# Patient Record
Sex: FEMALE | Race: WHITE | NOT HISPANIC OR LATINO | Employment: UNEMPLOYED | URBAN - METROPOLITAN AREA
[De-identification: names, ages, dates, MRNs, and addresses within clinical notes are randomized per-mention and may not be internally consistent; named-entity substitution may affect disease eponyms.]

---

## 2022-06-02 LAB — HCV AB SER-ACNC: NEGATIVE

## 2023-08-21 ENCOUNTER — TELEPHONE (OUTPATIENT)
Dept: ADMINISTRATIVE | Facility: OTHER | Age: 53
End: 2023-08-21

## 2023-08-21 RX ORDER — GABAPENTIN 300 MG/1
CAPSULE ORAL
COMMUNITY
Start: 2023-04-10 | End: 2023-08-22

## 2023-08-21 RX ORDER — METHOCARBAMOL 500 MG/1
500 TABLET, FILM COATED ORAL
COMMUNITY
Start: 2022-08-18 | End: 2023-08-22 | Stop reason: SDUPTHER

## 2023-08-21 NOTE — TELEPHONE ENCOUNTER
----- Message from Laz Jones sent at 8/21/2023  9:19 AM EDT -----  Regarding: care gap request HEP C, Mammogram, PAP  08/21/23 9:19 AM    Hello, our patient attached above has had Hepatitis C completed/performed. Please assist in updating the patient chart by pulling the Care Everywhere (CE) document. The date of service is 6/2/2022. Thank you,  Laz [facility]    08/21/23 9:19 AM    Hel, our patient Yulia Haines has had Mammogram completed/performed. Please assist in updating the patient chart by pulling the Care Everywhere (CE) document. The date of service is 6/2/2022. Thank you,  Laz [facility]     08/21/23 9:19 AM    Hel, our patient Yulia Haines has had Pap Smear (HPV) aka Cervical Cancer Screening completed/performed. Please assist in updating the patient chart by pulling the Care Everywhere (CE) document. The date of service is 7/26/2021.      Thank you,  Laz Jones   [address]

## 2023-08-21 NOTE — TELEPHONE ENCOUNTER
Upon review of the In Basket request we were able to locate, review, and update the patient chart as requested for Hepatitis C , Mammogram and Pap Smear (HPV) aka Cervical Cancer Screening. Any additional questions or concerns should be emailed to the Practice Liaisons via the appropriate education email address, please do not reply via In Basket.     Thank you  Nasra Sutherland

## 2023-08-22 ENCOUNTER — OFFICE VISIT (OUTPATIENT)
Dept: FAMILY MEDICINE CLINIC | Facility: CLINIC | Age: 53
End: 2023-08-22
Payer: COMMERCIAL

## 2023-08-22 VITALS
WEIGHT: 133.8 LBS | HEIGHT: 64 IN | RESPIRATION RATE: 16 BRPM | OXYGEN SATURATION: 98 % | BODY MASS INDEX: 22.84 KG/M2 | SYSTOLIC BLOOD PRESSURE: 122 MMHG | TEMPERATURE: 96.3 F | DIASTOLIC BLOOD PRESSURE: 80 MMHG | HEART RATE: 74 BPM

## 2023-08-22 DIAGNOSIS — F33.0 DEPRESSION, MAJOR, RECURRENT, MILD (HCC): ICD-10-CM

## 2023-08-22 DIAGNOSIS — R76.8 POSITIVE ANA (ANTINUCLEAR ANTIBODY): ICD-10-CM

## 2023-08-22 DIAGNOSIS — M62.838 MUSCLE SPASM: ICD-10-CM

## 2023-08-22 DIAGNOSIS — Z76.89 ENCOUNTER TO ESTABLISH CARE: Primary | ICD-10-CM

## 2023-08-22 DIAGNOSIS — Z12.31 SCREENING MAMMOGRAM FOR BREAST CANCER: ICD-10-CM

## 2023-08-22 DIAGNOSIS — Z12.4 CERVICAL CANCER SCREENING: ICD-10-CM

## 2023-08-22 DIAGNOSIS — F41.9 ANXIETY: ICD-10-CM

## 2023-08-22 DIAGNOSIS — E78.2 MIXED HYPERLIPIDEMIA: ICD-10-CM

## 2023-08-22 DIAGNOSIS — G57.31: ICD-10-CM

## 2023-08-22 DIAGNOSIS — R73.01 ELEVATED FASTING GLUCOSE: ICD-10-CM

## 2023-08-22 PROBLEM — M21.371 RIGHT FOOT DROP: Status: ACTIVE | Noted: 2023-03-08

## 2023-08-22 PROBLEM — M16.11 PRIMARY OSTEOARTHRITIS OF RIGHT HIP: Status: ACTIVE | Noted: 2022-08-18

## 2023-08-22 PROCEDURE — 99204 OFFICE O/P NEW MOD 45 MIN: CPT | Performed by: NURSE PRACTITIONER

## 2023-08-22 RX ORDER — METHOCARBAMOL 500 MG/1
500 TABLET, FILM COATED ORAL 3 TIMES DAILY PRN
Qty: 30 TABLET | Refills: 0 | Status: SHIPPED | OUTPATIENT
Start: 2023-08-22

## 2023-08-22 NOTE — ASSESSMENT & PLAN NOTE
Resulting from hip replacement surgery/drop foot. Utilize MAFO when needed or cane for ambulatory support.  Referral to physiatry given per patient request.
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
## 2023-08-22 NOTE — PATIENT INSTRUCTIONS
Complete labs, these are fasting. Follow up with gynecology and physiatry. Referrals provided. Robaxin refilled. Re-start therapy. Please call the office if you are experiencing any worsening of symptoms or no symptom improvement. How to find a therapist:  You can call the back of your insurance card for covered providers or check on your insurance's website. Or you can visit [employer] to find a covered therapist. Lou [facility] can filter by your insurance type, location, and other preferences. This will give you a list with pictures and bios about the therapists so you can appropriately choose one you feel will be the most helpful for your goals.

## 2023-08-25 ENCOUNTER — TELEPHONE (OUTPATIENT)
Dept: PSYCHIATRY | Facility: CLINIC | Age: 53
End: 2023-08-25

## 2023-08-25 NOTE — TELEPHONE ENCOUNTER
Patient contacted the office seeking to schedule an appt with Leslee Angulo. Writer verified that the patient is established at the [facility] office. Writer informed the patient he will notify the provider's PSR of the call. The PSR will be in contact at her earliest convenience to assist with scheduling.

## 2023-08-25 NOTE — ASSESSMENT & PLAN NOTE
Declines medication. Recommended to re-start therapy.  Information given on innovations- patient declines

## 2023-09-15 ENCOUNTER — SOCIAL WORK (OUTPATIENT)
Dept: BEHAVIORAL/MENTAL HEALTH CLINIC | Facility: CLINIC | Age: 53
End: 2023-09-15

## 2023-09-15 ENCOUNTER — TELEPHONE (OUTPATIENT)
Dept: FAMILY MEDICINE CLINIC | Facility: CLINIC | Age: 53
End: 2023-09-15

## 2023-09-15 DIAGNOSIS — F43.10 POSTTRAUMATIC STRESS DISORDER WITH DISSOCIATIVE SYMPTOMS: Primary | ICD-10-CM

## 2023-09-15 DIAGNOSIS — G57.31: Primary | ICD-10-CM

## 2023-09-15 DIAGNOSIS — F33.0 DEPRESSION, MAJOR, RECURRENT, MILD (HCC): ICD-10-CM

## 2023-09-15 NOTE — TELEPHONE ENCOUNTER
----- Message from Pj Shantanu sent at 9/12/2023  9:24 AM EDT -----  Regarding: FW: Physiatry  Contact: [phone]    ----- Message -----  From: Nikki Kermitaustin  Sent: 9/11/2023   7:35 PM EDT  To: [facility]  Subject: Physiatry                                        Great thank you so much, Kaley Mary. I believe their office said that they would call me for an appointment after they received the records, so if I don't hear from them, I'll let you know. Also, I would like an EMG which they said that they will need a script for. Please let Lizy Pruitt know that my last EMG was 11/22 and it's almost a year. Probably time for another one, in my opinion so in order to facilitate that down the road with Dr. Michoacano Barrow I would appreciate a script. I'm also not a doctor, though!  Thank you again,   Catherine Rich
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
## 2023-10-01 NOTE — PROGRESS NOTES
Subjective      Chata Monroy is a 52 y.o. female who presents for annual exam.  Last Pap smear 21 NILM/ HR HPV(-). Last mammogram 22 birads 1. CRC screening spring 2022 with [facility]. The patient has no complaints today. The patient is sexually active. The patient is not taking hormone replacement therapy. Patient denies post-menopausal vaginal bleeding. . The patient wears seatbelts: yes. The patient participates in regular exercise: no. The patient reports that there is not domestic violence in her life. Menstrual History:  OB History        5    Para   1    Term                AB   4    Living   1       SAB        IAB        Ectopic        Multiple        Live Births   1                Menarche age: 13  No LMP recorded. The following portions of the patient's history were reviewed and updated as appropriate: allergies, current medications, past family history, past medical history, past social history, past surgical history and problem list.    Review of Systems  Pertinent items are noted in HPI. Objective      /78   Ht 5' 4" (1.626 m)   Wt 60.3 kg (133 lb)   BMI 22.83 kg/m²     General:   alert and oriented, in no acute distress   Heart: regular rate and rhythm, S1, S2 normal, no murmur, click, rub or gallop   Lungs: clear to auscultation bilaterally   Abdomen: soft, non-tender, without masses or organomegaly   Vulva: normal, Bartholin's, Urethra, [place]'s normal   Vagina: normal mucosa, normal discharge, no palpable nodules   Cervix: no cervical motion tenderness and no lesions   Uterus: normal size, non-tender, normal shape and consistency   Adnexa: normal adnexa and no mass, fullness, tenderness   Breast:  breasts appear normal, no suspicious masses, no skin or nipple changes or axillary nodes.           Assessment/Plan     Diagnoses and all orders for this visit:    Well woman exam with routine gynecological exam    Cervical cancer screening  -     Ambulatory Referral to Obstetrics / Gynecology    Other orders  -     Diclofenac Sodium (Voltaren) 1 %      Care gap request placed for colonoscopy results  Encouraged to schedule mammogram   Encouraged healthy diet, exercise and lifestyle  Encouraged follow-up with PCP and specialists as needed  Encouraged seasonal influenza vaccination  Encouraged supplementation with calcium, vitamin-D and strength training exercises for good bone health  Will call / Mychart message with results

## 2023-10-02 ENCOUNTER — OFFICE VISIT (OUTPATIENT)
Dept: OBGYN CLINIC | Facility: MEDICAL CENTER | Age: 53
End: 2023-10-02
Payer: COMMERCIAL

## 2023-10-02 ENCOUNTER — TELEPHONE (OUTPATIENT)
Dept: ADMINISTRATIVE | Facility: OTHER | Age: 53
End: 2023-10-02

## 2023-10-02 VITALS
HEIGHT: 64 IN | DIASTOLIC BLOOD PRESSURE: 78 MMHG | BODY MASS INDEX: 22.71 KG/M2 | SYSTOLIC BLOOD PRESSURE: 115 MMHG | WEIGHT: 133 LBS

## 2023-10-02 DIAGNOSIS — Z01.419 WELL WOMAN EXAM WITH ROUTINE GYNECOLOGICAL EXAM: Primary | ICD-10-CM

## 2023-10-02 DIAGNOSIS — Z12.4 CERVICAL CANCER SCREENING: ICD-10-CM

## 2023-10-02 PROBLEM — M16.11 PRIMARY OSTEOARTHRITIS OF RIGHT HIP: Status: RESOLVED | Noted: 2022-08-18 | Resolved: 2023-10-02

## 2023-10-02 PROBLEM — F33.0 DEPRESSION, MAJOR, RECURRENT, MILD (HCC): Status: RESOLVED | Noted: 2023-03-08 | Resolved: 2023-10-02

## 2023-10-02 PROCEDURE — 99386 PREV VISIT NEW AGE 40-64: CPT | Performed by: NURSE PRACTITIONER

## 2023-10-02 NOTE — TELEPHONE ENCOUNTER
----- Message from Yazmin [address] sent at 10/2/2023  8:17 AM EDT -----  10/02/23 8:18 AM    Hello, our patient Timmy Nayak has had CRC: Colonoscopy completed/performed. Please assist in updating the patient chart by making an External outreach to [facility]  facility located in [address] [place] [address]. The date of service is spring 2022.     Thank you,  CRISTHIAN Baker  [facility]

## 2023-10-02 NOTE — TELEPHONE ENCOUNTER
Upon review of the In Basket request we were able to locate, review, and update the patient chart as requested for CRC: Colonoscopy. Any additional questions or concerns should be emailed to the Practice Liaisons via the appropriate education email address, please do not reply via In Basket.     Thank you  Michael Davis

## 2023-10-13 ENCOUNTER — APPOINTMENT (OUTPATIENT)
Dept: LAB | Facility: CLINIC | Age: 53
End: 2023-10-13
Payer: COMMERCIAL

## 2023-10-13 DIAGNOSIS — R73.01 ELEVATED FASTING GLUCOSE: ICD-10-CM

## 2023-10-13 DIAGNOSIS — E78.2 MIXED HYPERLIPIDEMIA: ICD-10-CM

## 2023-10-13 DIAGNOSIS — R76.8 POSITIVE ANA (ANTINUCLEAR ANTIBODY): ICD-10-CM

## 2023-10-13 LAB
ALBUMIN SERPL BCP-MCNC: 4.3 G/DL (ref 3.5–5)
ALP SERPL-CCNC: 58 U/L (ref 34–104)
ALT SERPL W P-5'-P-CCNC: 15 U/L (ref 7–52)
ANA SER QL IA: NEGATIVE
ANION GAP SERPL CALCULATED.3IONS-SCNC: 8 MMOL/L
AST SERPL W P-5'-P-CCNC: 25 U/L (ref 13–39)
BASOPHILS # BLD AUTO: 0.04 THOUSANDS/ÂΜL (ref 0–0.1)
BASOPHILS NFR BLD AUTO: 1 % (ref 0–1)
BILIRUB SERPL-MCNC: 0.45 MG/DL (ref 0.2–1)
BUN SERPL-MCNC: 12 MG/DL (ref 5–25)
CALCIUM SERPL-MCNC: 9.3 MG/DL (ref 8.4–10.2)
CHLORIDE SERPL-SCNC: 104 MMOL/L (ref 96–108)
CHOLEST SERPL-MCNC: 224 MG/DL
CO2 SERPL-SCNC: 28 MMOL/L (ref 21–32)
CREAT SERPL-MCNC: 0.77 MG/DL (ref 0.6–1.3)
EOSINOPHIL # BLD AUTO: 0.03 THOUSAND/ÂΜL (ref 0–0.61)
EOSINOPHIL NFR BLD AUTO: 1 % (ref 0–6)
ERYTHROCYTE [DISTWIDTH] IN BLOOD BY AUTOMATED COUNT: 12.9 % (ref 11.6–15.1)
EST. AVERAGE GLUCOSE BLD GHB EST-MCNC: 111 MG/DL
GFR SERPL CREATININE-BSD FRML MDRD: 87 ML/MIN/1.73SQ M
GLUCOSE P FAST SERPL-MCNC: 84 MG/DL (ref 65–99)
HBA1C MFR BLD: 5.5 %
HCT VFR BLD AUTO: 41 % (ref 34.8–46.1)
HDLC SERPL-MCNC: 64 MG/DL
HGB BLD-MCNC: 12.9 G/DL (ref 11.5–15.4)
IMM GRANULOCYTES # BLD AUTO: 0 THOUSAND/UL (ref 0–0.2)
IMM GRANULOCYTES NFR BLD AUTO: 0 % (ref 0–2)
LDLC SERPL CALC-MCNC: 140 MG/DL (ref 0–100)
LYMPHOCYTES # BLD AUTO: 1.3 THOUSANDS/ÂΜL (ref 0.6–4.47)
LYMPHOCYTES NFR BLD AUTO: 39 % (ref 14–44)
MCH RBC QN AUTO: 30.7 PG (ref 26.8–34.3)
MCHC RBC AUTO-ENTMCNC: 31.5 G/DL (ref 31.4–37.4)
MCV RBC AUTO: 98 FL (ref 82–98)
MONOCYTES # BLD AUTO: 0.45 THOUSAND/ÂΜL (ref 0.17–1.22)
MONOCYTES NFR BLD AUTO: 14 % (ref 4–12)
NEUTROPHILS # BLD AUTO: 1.49 THOUSANDS/ÂΜL (ref 1.85–7.62)
NEUTS SEG NFR BLD AUTO: 45 % (ref 43–75)
NONHDLC SERPL-MCNC: 160 MG/DL
NRBC BLD AUTO-RTO: 0 /100 WBCS
PLATELET # BLD AUTO: 268 THOUSANDS/UL (ref 149–390)
PMV BLD AUTO: 10.8 FL (ref 8.9–12.7)
POTASSIUM SERPL-SCNC: 4.1 MMOL/L (ref 3.5–5.3)
PROT SERPL-MCNC: 7.3 G/DL (ref 6.4–8.4)
RBC # BLD AUTO: 4.2 MILLION/UL (ref 3.81–5.12)
SODIUM SERPL-SCNC: 140 MMOL/L (ref 135–147)
TRIGL SERPL-MCNC: 98 MG/DL
TSH SERPL DL<=0.05 MIU/L-ACNC: 2.13 UIU/ML (ref 0.45–4.5)
WBC # BLD AUTO: 3.31 THOUSAND/UL (ref 4.31–10.16)

## 2023-10-13 PROCEDURE — 80053 COMPREHEN METABOLIC PANEL: CPT

## 2023-10-13 PROCEDURE — 85025 COMPLETE CBC W/AUTO DIFF WBC: CPT

## 2023-10-13 PROCEDURE — 80061 LIPID PANEL: CPT

## 2023-10-13 PROCEDURE — 83036 HEMOGLOBIN GLYCOSYLATED A1C: CPT

## 2023-10-13 PROCEDURE — 84443 ASSAY THYROID STIM HORMONE: CPT

## 2023-10-13 PROCEDURE — 86038 ANTINUCLEAR ANTIBODIES: CPT

## 2023-10-13 PROCEDURE — 36415 COLL VENOUS BLD VENIPUNCTURE: CPT

## 2023-12-22 ENCOUNTER — HOSPITAL ENCOUNTER (OUTPATIENT)
Dept: MAMMOGRAPHY | Facility: MEDICAL CENTER | Age: 53
Discharge: HOME | End: 2023-12-22
Payer: COMMERCIAL

## 2023-12-22 VITALS — WEIGHT: 133 LBS | HEIGHT: 64 IN | BODY MASS INDEX: 22.71 KG/M2

## 2023-12-22 DIAGNOSIS — Z12.31 SCREENING MAMMOGRAM FOR BREAST CANCER: ICD-10-CM

## 2023-12-22 PROCEDURE — 77063 BREAST TOMOSYNTHESIS BI: CPT

## 2023-12-22 PROCEDURE — 77067 SCR MAMMO BI INCL CAD: CPT

## 2024-01-30 ENCOUNTER — TELEPHONE (OUTPATIENT)
Age: 54
End: 2024-01-30

## 2024-03-31 DIAGNOSIS — R52 PAIN: Primary | ICD-10-CM

## 2024-06-25 ENCOUNTER — VBI (OUTPATIENT)
Dept: ADMINISTRATIVE | Facility: OTHER | Age: 54
End: 2024-06-25

## 2024-06-25 NOTE — TELEPHONE ENCOUNTER
06/25/24 10:53 AM     Chart reviewed for Pap Smear (HPV) aka Cervical Cancer Screening ; nothing is submitted to the patient's insurance at this time.     Rasheeda Stauffer   [facility]

## 2024-08-19 ENCOUNTER — HOSPITAL ENCOUNTER (EMERGENCY)
Facility: HOSPITAL | Age: 54
Discharge: HOME | End: 2024-08-19
Attending: EMERGENCY MEDICINE | Admitting: EMERGENCY MEDICINE
Payer: COMMERCIAL

## 2024-08-19 VITALS
HEIGHT: 64 IN | WEIGHT: 161.38 LBS | SYSTOLIC BLOOD PRESSURE: 156 MMHG | OXYGEN SATURATION: 98 % | DIASTOLIC BLOOD PRESSURE: 96 MMHG | BODY MASS INDEX: 27.55 KG/M2 | HEART RATE: 58 BPM | RESPIRATION RATE: 18 BRPM | TEMPERATURE: 98.3 F

## 2024-08-19 DIAGNOSIS — I73.00 RAYNAUD'S PHENOMENON WITHOUT GANGRENE: Primary | ICD-10-CM

## 2024-08-19 DIAGNOSIS — R03.0 ELEVATED BLOOD PRESSURE READING: ICD-10-CM

## 2024-08-19 LAB
ATRIAL RATE: 56 BPM
P AXIS: 62 DEGREES
PR INTERVAL: 136 MS
QRS AXIS: 57 DEGREES
QRSD INTERVAL: 86 MS
QT INTERVAL: 462 MS
QTC INTERVAL: 445 MS
T WAVE AXIS: 66 DEGREES
VENTRICULAR RATE: 56 BPM

## 2024-08-19 PROCEDURE — 99284 EMERGENCY DEPT VISIT MOD MDM: CPT | Performed by: EMERGENCY MEDICINE

## 2024-08-19 PROCEDURE — 93005 ELECTROCARDIOGRAM TRACING: CPT

## 2024-08-19 PROCEDURE — 99284 EMERGENCY DEPT VISIT MOD MDM: CPT

## 2024-08-19 PROCEDURE — 93010 ELECTROCARDIOGRAM REPORT: CPT | Performed by: INTERNAL MEDICINE

## 2024-08-19 NOTE — ED PROVIDER NOTES
"History  Chief Complaint   Patient presents with    Numbness    Hypertension     Pt reports being at [facility] when she started with R fingertip numbness and lightheadedness, seen at medical tent, BP 170s/120s, no prior hx of HTN. Reports teeth chattering in medical tent and ambulance, and had \"twinge\" of pain under left arm earlier this morning that has since resolved. Reports recently started taking aswaganda.      Patient is a 54-year-old female who presents for high blood pressure and fingertip numbness.  Patient states that she was at the [facility] for work when she started with feeling \"unwell\".  She says that she started to have tingling in her right fingertips and started to feel lightheaded and dizzy.  She had a blood pressure taken in the medical tent and was noted to be in the 170s over 120s.  She went to a medical tent where she then noticed that her fingers on her right hand were turning white.  She states that this lasted for few minutes and then went away.  She now feels like her lightheadedness and dizziness have also resolved and she does have some residual tingling in her right fingertips.  She denies any headache, vision changes, chest pain, shortness breath, palpitations, decreased urination.  Patient states she has had borderline blood pressures in the past but she has managed this with diet.  Patient does note that 2 of her sisters have a history of Raynaud's phenomenon.        Prior to Admission Medications   Prescriptions Last Dose Informant Patient Reported? Taking?   Diclofenac Sodium (VOLTAREN) 1 %   No No   Sig: Apply 2 g topically 4 (four) times a day   Diclofenac Sodium (Voltaren) 1 %   Yes No   methocarbamol (ROBAXIN) 500 mg tablet   No No   Sig: Take 1 tablet (500 mg total) by mouth 3 (three) times a day as needed for muscle spasms      Facility-Administered Medications: None       Past Medical History:   Diagnosis Date    Abnormal Pap smear of cervix        Past Surgical History: "   Procedure Laterality Date    APPENDECTOMY  1998    HAND SURGERY      dog bite    REPLACEMENT TOTAL HIP W/  RESURFACING IMPLANTS Right 2022       Family History   Problem Relation Age of Onset    Hypertension Mother     Alcohol abuse Father     Diabetes Father     Diabetes Sister     Early death Sister     No Known Problems Sister     No Known Problems Sister     Breast cancer Sister     Heart disease Maternal Grandmother     Heart attack Maternal Grandmother     Colon cancer Maternal Grandmother     Dementia Maternal Grandfather     Early death Paternal Grandmother     Alcohol abuse Paternal Grandfather     GI problems Son     No Known Problems Maternal Aunt     No Known Problems Maternal Aunt     Ovarian cancer Neg Hx      I have reviewed and agree with the history as documented.    E-Cigarette/Vaping    E-Cigarette Use Never User      E-Cigarette/Vaping Substances    Nicotine No     THC No     CBD No     Flavoring No     Other No     Unknown No      Social History     Tobacco Use    Smoking status: Never    Smokeless tobacco: Never   Vaping Use    Vaping status: Never Used   Substance Use Topics    Alcohol use: Not Currently    Drug use: Never        Review of Systems   Constitutional:  Negative for chills and fever.   HENT:  Negative for congestion, rhinorrhea and sore throat.    Eyes:  Negative for pain and visual disturbance.   Respiratory:  Negative for cough and shortness of breath.    Cardiovascular:  Negative for chest pain and palpitations.   Gastrointestinal:  Negative for abdominal pain, constipation, diarrhea, nausea and vomiting.   Genitourinary:  Negative for dysuria and hematuria.   Musculoskeletal:  Negative for back pain and neck pain.   Skin:  Negative for color change and rash.   Neurological:  Positive for dizziness and light-headedness. Negative for weakness, numbness and headaches.   All other systems reviewed and are negative.      Physical Exam  ED Triage Vitals [08/19/24 1157]    Temperature Pulse Respirations Blood Pressure SpO2   98.3 °F (36.8 °C) 58 18 156/96 98 %      Temp Source Heart Rate Source Patient Position - Orthostatic VS BP Location FiO2 (%)   Oral Monitor Sitting Right arm --      Pain Score       No Pain             Orthostatic Vital Signs  Vitals:    08/19/24 1157   BP: 156/96   Pulse: 58   Patient Position - Orthostatic VS: Sitting       Physical Exam  Vitals and nursing note reviewed.   Constitutional:       General: She is not in acute distress.     Appearance: Normal appearance. She is well-developed and normal weight. She is not ill-appearing, toxic-appearing or diaphoretic.   HENT:      Head: Normocephalic and atraumatic.      Right Ear: External ear normal.      Left Ear: External ear normal.      Nose: Nose normal.      Mouth/Throat:      Mouth: Mucous membranes are moist.      Pharynx: Oropharynx is clear.   Eyes:      General: No scleral icterus.        Right eye: No discharge.         Left eye: No discharge.      Extraocular Movements: Extraocular movements intact.      Conjunctiva/sclera: Conjunctivae normal.      Pupils: Pupils are equal, round, and reactive to light.   Cardiovascular:      Rate and Rhythm: Normal rate and regular rhythm.      Pulses: Normal pulses.      Heart sounds: Normal heart sounds. No murmur heard.     No friction rub. No gallop.   Pulmonary:      Effort: Pulmonary effort is normal. No respiratory distress.      Breath sounds: Normal breath sounds. No stridor. No wheezing, rhonchi or rales.   Abdominal:      General: Abdomen is flat. Bowel sounds are normal. There is no distension.      Palpations: Abdomen is soft.      Tenderness: There is no abdominal tenderness. There is no guarding.   Musculoskeletal:         General: No tenderness.      Cervical back: Neck supple.      Right lower leg: No edema.      Left lower leg: No edema.   Skin:     General: Skin is warm and dry.      Capillary Refill: Capillary refill takes less than 2  seconds.      Coloration: Skin is not jaundiced or pale.   Neurological:      General: No focal deficit present.      Mental Status: She is alert and oriented to person, place, and time.      Cranial Nerves: No cranial nerve deficit.      Sensory: Sensory deficit present.      Motor: No weakness.      Coordination: Coordination normal.      Comments: Slight decrease sensation in the pads of the right fingertips.  Otherwise sensation is intact in the bilateral upper and lower extremities   Psychiatric:         Behavior: Behavior normal.      Comments: Anxious.         ED Medications  Medications - No data to display    Diagnostic Studies  Results Reviewed       None                   No orders to display         Procedures  Procedures      ED Course  ED Course as of 08/19/24 1543   Mon Aug 19, 2024   1210 This EKG interpreted by me. Rate 56, rhythm sinus bradycardia, normal axis, intervals normal, no acute ST or T wave changes                               SBIRT 20yo+      Flowsheet Row Most Recent Value   Initial Alcohol Screen: US AUDIT-C     1. How often do you have a drink containing alcohol? 0 Filed at: 08/19/2024 1157   2. How many drinks containing alcohol do you have on a typical day you are drinking?  0 Filed at: 08/19/2024 1151   3a. Male UNDER 65: How often do you have five or more drinks on one occasion? 0 Filed at: 08/19/2024 1153   3b. FEMALE Any Age, or MALE 65+: How often do you have 4 or more drinks on one occassion? 0 Filed at: 08/19/2024 1157   Audit-C Score 0 Filed at: 08/19/2024 1157   ROBERTA: How many times in the past year have you...    Used an illegal drug or used a prescription medication for non-medical reasons? Never Filed at: 08/19/2024 1156                  Medical Decision Making  Patient is a 54-year-old female who presents for high blood pressure and fingertip numbness.    DDx includes but is not limited to hypertension, Raynaud's phenomenon, arrhythmia.  Patient has no physical exam or  history findings to be concerned for endorgan damage such as ACS or ARYAN.  Patient's EKG shows normal sinus rhythm on my interpretation.  Fingertip discoloration likely secondary to Raynaud's phenomenon, she does note a family history of this.  Will plan to discharge with referral to rheumatology.  Patient is ambulatory without symptoms and able to eat and drink.  Also advised close follow-up with family doctor to discuss high blood pressure.  Return precautions given, all questions answered.          Disposition  Final diagnoses:   Raynaud's phenomenon without gangrene   Elevated blood pressure reading     Time reflects when diagnosis was documented in both MDM as applicable and the Disposition within this note       Time User Action Codes Description Comment    8/19/2024 12:12 PM Krivchenia, Julio Cesar Add [I10] Hypertension     8/19/2024 12:12 PM Krivchenia, Julio Cesar Add [I73.00] Raynaud's phenomenon without gangrene     8/19/2024 12:13 PM Krivchenia, Julio Cesar Modify [I73.00] Raynaud's phenomenon without gangrene     8/19/2024 12:13 PM Krivchenia, Julio Cesar Remove [I10] Hypertension     8/19/2024 12:13 PM Krivchenia, Julio Cesar Add [R03.0] Elevated blood pressure reading           ED Disposition       ED Disposition   Discharge    Condition   Stable    Date/Time   Mon Aug 19, 2024 1219    Comment   Soraya Ott discharge to home/self care.                   Follow-up Information       Follow up With Specialties Details Why Contact Info Additional Information    CRISTHIAN Barron Nurse Practitioner Schedule an appointment as soon as possible for a visit  To discuss your high blood pressure [address], [address]  [facility] [address]  [phone]       [facility] Schedule an appointment as soon as possible for a visit   [facility] [address] [address] [phone] [facility], [address] [place] [address], [place] [address], [facility],  [place], [address], [phone]            Discharge Medication List as of 8/19/2024 12:31 PM        CONTINUE these medications which have NOT CHANGED    Details   !! Diclofenac Sodium (Voltaren) 1 % Starting Wed 7/1/2020, Historical Med      !! Diclofenac Sodium (VOLTAREN) 1 % Apply 2 g topically 4 (four) times a day, Starting Mon 4/1/2024, Normal      methocarbamol (ROBAXIN) 500 mg tablet Take 1 tablet (500 mg total) by mouth 3 (three) times a day as needed for muscle spasms, Starting Tue 8/22/2023, Normal       !! - Potential duplicate medications found. Please discuss with provider.            PDMP Review       None             ED Provider  Attending physically available and evaluated Soraya Ott. I managed the patient along with the ED Attending.    Electronically Signed by           Julio Cesar Dumont MD  08/19/24 7853

## 2024-08-19 NOTE — DISCHARGE INSTRUCTIONS
You have been evaluated in the emergency department for high blood pressure and numbness.  Your discoloration of your fingers is likely something called Raynaud's following on which you can read more about in these papers.  Because you are otherwise asymptomatic, you do not need further testing or treatment for your high blood pressure here.  You will need to follow-up with your family doctor.    Please schedule follow-up with your family doctor within 2 weeks to be reevaluated for your high blood pressure.    Please schedule a visit with rheumatology to be evaluated for Raynaud's phenomenon.    Please return if you develop any new or concerning symptoms including persistent chest pain, difficulty breathing, heart palpitations, or changes with urination.

## 2024-08-19 NOTE — Clinical Note
Soraya Ott was seen and treated in our emergency department on 8/19/2024.                Diagnosis:     Soraya  may return to work on return date.    She may return on this date: 08/19/2024         If you have any questions or concerns, please don't hesitate to call.      Julio Cesar Dumnot MD    ______________________________           _______________          _______________  Hospital Representative                              Date                                Time

## 2024-08-19 NOTE — ED ATTENDING ATTESTATION
8/19/2024  IArabella DO, saw and evaluated the patient. I have discussed the patient with the resident/non-physician practitioner and agree with the resident's/non-physician practitioner's findings, Plan of Care, and MDM as documented in the resident's/non-physician practitioner's note, except where noted. All available labs and Radiology studies were reviewed.  I was present for key portions of any procedure(s) performed by the resident/non-physician practitioner and I was immediately available to provide assistance.       At this point I agree with the current assessment done in the Emergency Department.  I have conducted an independent evaluation of this patient a history and physical is as follows:        A 53 yo female with no significant pmhx; presents after an episode of numbness/tingling to her right fingers that began while she was at an event at the [facility].  Pt reports noticing her fingers appeared more pale than normal.  Pt states shortly after onset she began to feel unwell and went to the medical tent where medics found her BP to be 170/120's.  Pt ultimately decided to come to the ED for evaluation.  On arrival, pt reports to feeling better.  The sensation and discoloration to her right fingers has resolved.  Pt otherwise denies fever, chills, chest pain, dyspnea, abd pain, N/V/D, peripheral edema and focal weakness.  Pt has never had similar symptoms.    Physical Exam  General Appearance: alert and oriented, nad, non toxic appearing  Skin:  Warm, dry, intact  HEENT: atraumatic, normocephalic  Neck: Supple, trachea midline  Cardiac: RRR; no murmurs, rub, gallops  Pulmonary: lungs CTAB; no wheezes, rales, rhonchi  Gastrointestinal: abdomen soft, nontender, nondistended; no guarding or rebound tenderness; good bowel sounds, no mass or bruits  Extremities:  no pedal edema, 2+ pulses; no calf tenderness, no clubbing, no cyanosis  Neuro:  CN 2-12 grossly intact.  5/5 motor strength to the  bilateral upper and lower extremities.  Sensation intact throughout.  No pronator drift.  Normal finger-to-nose to the bilateral upper extremities, normal heel-to-shin to the bilateral lower extremities.  Psych:  Normal mood and affect, normal judgement and insight    Assessment and Plan:  Fingertip discoloration and numbness/tingling, now resolved.  Pt currently asymptomatic, neurologically intact.  BP has spontaneously improved.  EKG reviewed, within normal limits.  Suspect symptoms to her right hand may have been due to Raynaud's phenomenon.  Reassurance provided, recommend PCP follow up.    ED Course         Critical Care Time  Procedures

## 2024-08-20 ENCOUNTER — VBI (OUTPATIENT)
Dept: ADMINISTRATIVE | Facility: OTHER | Age: 54
End: 2024-08-20

## 2024-08-20 NOTE — TELEPHONE ENCOUNTER
08/20/24 1:59 PM    Patient contacted post ED visit, first outreach attempt made. Message was left for patient to return a call to the VBI Department at [facility]: Phone [phone].    Thank you.  Yvonne Duckworth  [facility]

## 2024-08-21 NOTE — TELEPHONE ENCOUNTER
08/21/24 3:43 PM    Patient contacted post ED visit, second outreach attempt made. Message was left for patient to return a call to the VBI Department at [facility]: Phone [phone].    Thank you.  Yvonne Duckworth  [facility]

## 2024-08-23 NOTE — TELEPHONE ENCOUNTER
08/23/24 2:28 PM    Patient contacted post ED visit, VBI department spoke with patient/caregiver and outreach was successful.    Thank you.  Yvonne Duckworth  [facility]

## 2024-08-23 NOTE — TELEPHONE ENCOUNTER
08/23/24 2:29 PM    Patient contacted post ED visit, VBI department spoke with patient/caregiver and outreach was successful.    Thank you.  Yvonne Duckworth  [facility]

## 2024-11-12 ENCOUNTER — OFFICE VISIT (OUTPATIENT)
Dept: URGENT CARE | Facility: MEDICAL CENTER | Age: 54
End: 2024-11-12
Payer: COMMERCIAL

## 2024-11-12 VITALS
TEMPERATURE: 98.3 F | HEART RATE: 70 BPM | DIASTOLIC BLOOD PRESSURE: 80 MMHG | RESPIRATION RATE: 18 BRPM | SYSTOLIC BLOOD PRESSURE: 133 MMHG | OXYGEN SATURATION: 99 %

## 2024-11-12 DIAGNOSIS — L30.9 DERMATITIS: Primary | ICD-10-CM

## 2024-11-12 PROCEDURE — 99213 OFFICE O/P EST LOW 20 MIN: CPT | Performed by: NURSE PRACTITIONER

## 2024-11-12 RX ORDER — TRIAMCINOLONE ACETONIDE 1 MG/G
CREAM TOPICAL 2 TIMES DAILY
Qty: 80 G | Refills: 0 | Status: SHIPPED | OUTPATIENT
Start: 2024-11-12

## 2024-11-12 NOTE — PROGRESS NOTES
[facility]        NAME: Soraya Ott is a 54 y.o. female  : 1970    MRN: [id number]  DATE: 2024  TIME: 10:31 AM    Assessment and Plan   Dermatitis [L30.9]  1. Dermatitis  triamcinolone (KENALOG) 0.1 % cream        Patient in NAD and VSS upon exam. Discussed with patient treatment for dermatitis with topical steroid. Low concern for cellulitis at this point. Discussed supportive care and return precautions. Patient/Parent agreeable to plan of care and all questions answered. Note for work/school given as needed.      Patient Instructions       Follow up with PCP in 3-5 days.  Proceed to  ER if symptoms worsen.    If tests have been performed at [facility], our office will contact you with results if changes need to be made to the care plan discussed with you at the visit.  You can review your full results on [facility].    Chief Complaint     Chief Complaint   Patient presents with    Rash     Patient c/o a rash on both her legs with dizziness x 2 days . Patient reports that she has nausea, vomiting, and diarrhea but it resolved Yesterday,           History of Present Illness       Started: 4 days  Positive: vomiting and diarrhea, has resolved  Vertigo/dizziness improved  Hx vertigo  Concerned with rash on legs, noticed this morning  Negative: fevers  Denies itching, painful rash  Denies CP, SOB, trouble breathing  Treatment: cortisone to b/l calves        Review of Systems   Review of Systems   Gastrointestinal:  Positive for diarrhea and vomiting.   Neurological:  Positive for dizziness.   All other systems reviewed and are negative.        Current Medications       Current Outpatient Medications:     triamcinolone (KENALOG) 0.1 % cream, Apply topically 2 (two) times a day, Disp: 80 g, Rfl: 0    Diclofenac Sodium (Voltaren) 1 %, , Disp: , Rfl:     Diclofenac Sodium (VOLTAREN) 1 %, Apply 2 g topically 4 (four) times a day, Disp: 350 g, Rfl: 0    methocarbamol (ROBAXIN) 500 mg  tablet, Take 1 tablet (500 mg total) by mouth 3 (three) times a day as needed for muscle spasms, Disp: 30 tablet, Rfl: 0    Current Allergies     Allergies as of 11/12/2024 - Reviewed 11/12/2024   Allergen Reaction Noted    Milk (cow) Abdominal Pain and GI Intolerance 01/01/1984    Wheat bran - food allergy Abdominal Pain, Diarrhea, and GI Intolerance 01/01/1984    Nickel Rash 07/22/2022            The following portions of the patient's history were reviewed and updated as appropriate: allergies, current medications, past family history, past medical history, past social history, past surgical history and problem list.     Past Medical History:   Diagnosis Date    Abnormal Pap smear of cervix        Past Surgical History:   Procedure Laterality Date    APPENDECTOMY  1998    HAND SURGERY      dog bite    REPLACEMENT TOTAL HIP W/  RESURFACING IMPLANTS Right 2022       Family History   Problem Relation Age of Onset    Hypertension Mother     Alcohol abuse Father     Diabetes Father     Diabetes Sister     Early death Sister     No Known Problems Sister     No Known Problems Sister     Breast cancer Sister     Heart disease Maternal Grandmother     Heart attack Maternal Grandmother     Colon cancer Maternal Grandmother     Dementia Maternal Grandfather     Early death Paternal Grandmother     Alcohol abuse Paternal Grandfather     GI problems Son     No Known Problems Maternal Aunt     No Known Problems Maternal Aunt     Ovarian cancer Neg Hx          Medications have been verified.        Objective   /80   Pulse 70   Temp 98.3 °F (36.8 °C)   Resp 18   SpO2 99%   No LMP recorded. Patient is postmenopausal.       Physical Exam     Physical Exam  Constitutional:       General: She is not in acute distress.     Appearance: Normal appearance. She is not ill-appearing.   HENT:      Head: Normocephalic and atraumatic.   Cardiovascular:      Rate and Rhythm: Normal rate.   Pulmonary:      Effort: Pulmonary effort is  normal.   Skin:     General: Skin is warm and dry.          Neurological:      Mental Status: She is alert.

## 2024-11-12 NOTE — LETTER
November 12, 2024     Patient: Soraya Ott   YOB: 1970   Date of Visit: 11/12/2024       To Whom It May Concern:    It is my medical opinion that Soraya Ott may return to work on 11/12/2024 .    If you have any questions or concerns, please don't hesitate to call.         Sincerely,        CRISTHIAN Cota    CC: No Recipients

## 2024-12-23 ENCOUNTER — OFFICE VISIT (OUTPATIENT)
Dept: FAMILY MEDICINE CLINIC | Facility: CLINIC | Age: 54
End: 2024-12-23
Payer: COMMERCIAL

## 2024-12-23 VITALS
WEIGHT: 129 LBS | DIASTOLIC BLOOD PRESSURE: 72 MMHG | TEMPERATURE: 97.8 F | HEIGHT: 64 IN | RESPIRATION RATE: 14 BRPM | SYSTOLIC BLOOD PRESSURE: 118 MMHG | OXYGEN SATURATION: 99 % | BODY MASS INDEX: 22.02 KG/M2 | HEART RATE: 87 BPM

## 2024-12-23 DIAGNOSIS — L30.9 DERMATITIS: ICD-10-CM

## 2024-12-23 DIAGNOSIS — Z12.31 ENCOUNTER FOR SCREENING MAMMOGRAM FOR MALIGNANT NEOPLASM OF BREAST: ICD-10-CM

## 2024-12-23 DIAGNOSIS — Z00.00 HEALTH MAINTENANCE EXAMINATION: Primary | ICD-10-CM

## 2024-12-23 DIAGNOSIS — M79.89 LEG SWELLING: ICD-10-CM

## 2024-12-23 DIAGNOSIS — R23.3 PETECHIAE: ICD-10-CM

## 2024-12-23 PROCEDURE — 99396 PREV VISIT EST AGE 40-64: CPT | Performed by: NURSE PRACTITIONER

## 2024-12-23 NOTE — PATIENT INSTRUCTIONS
Monitor blood pressure- goal <130/80.     Complete labs- these are fasting.     Follow up with dermatology.     Monitor leg swelling.     Complete breast cancer screening.     Please call the office if you are experiencing any worsening of symptoms or no symptom improvement.

## 2024-12-23 NOTE — PROGRESS NOTES
Adult Annual Physical  Name: Soraya Ott      : 1970      MRN: [id number]  Encounter Provider: CRISTHIAN Barron  Encounter Date: 2024   Encounter department: [facility] PRIMARY CARE    Assessment & Plan  Health maintenance examination    Orders:  •  TSH, 3rd generation with Free T4 reflex; Future  •  Lipid panel; Future  •  Comprehensive metabolic panel; Future  •  CBC and differential; Future    Petechiae  Complete labs. Follow up with dermatology   Orders:  •  Ambulatory Referral to Dermatology; Future  •  CBC and differential; Future  •  Protime-INR; Future  •  APTT; Future    Dermatitis  Complete labs. Follow up with dermatology   Dry skin noted. Encouraged adequate moisturizing   Orders:  •  Ambulatory Referral to Dermatology; Future    Encounter for screening mammogram for malignant neoplasm of breast  Does not want to complete this year- will complete next year. Understands yearly mammograms are the recommendation.   Orders:  •  Mammo screening bilateral w 3d and cad; Future    Leg swelling  Normal exam. No longer present. One isolated event. Discussed differentials- recommend close monitoring. Possible baker's cyst?  Call for any changes/ recurrent symptoms        Immunizations and preventive care screenings were discussed with patient today. Appropriate education was printed on patient's after visit summary.    Counseling:  Dental Health: discussed importance of regular tooth brushing, flossing, and dental visits.  Exercise: the importance of regular exercise/physical activity was discussed. Recommend exercise 3-5 times per week for at least 30 minutes.          History of Present Illness     Adult Annual Physical:  Patient presents for annual physical. Here for well visit/ follow up visit.     Rash (Spots on leg and arm for a few days then disappear not consistent ) small red pin point dots that do become itchy, they become itchy  Doesn't see dermatology. These have  "been present for a year.     Knee Swelling (Left leg and knee swell from time to time was happening all last week and nothing today no current pain, thinks its related to left hip)  No injury. No pain- just stiff.     Getting  in 3 weeks. .     Diet and Physical Activity:  - Diet/Nutrition: well balanced diet.  - Exercise:. active with work    Depression Screening:  - PHQ-2 Score: 0    General Health:  - Sleep: sleeps well. sleeps too much at times  - Vision: goes for regular eye exams.  - Dental: regular dental visits.    /GYN Health:  - Follows with GYN: yes.     Review of Systems   Constitutional:  Negative for chills and fever.   Eyes:  Negative for discharge.   Respiratory:  Negative for shortness of breath.    Cardiovascular:  Negative for chest pain.   Gastrointestinal:  Negative for constipation and diarrhea.   Genitourinary:  Negative for difficulty urinating.   Musculoskeletal:  Negative for joint swelling.   Skin:  Positive for rash.   Neurological:  Negative for headaches.   Hematological:  Negative for adenopathy.   Psychiatric/Behavioral:  The patient is not nervous/anxious.          Objective   /72 (BP Location: Left arm, Patient Position: Sitting, Cuff Size: Standard)   Pulse 87   Temp 97.8 °F (36.6 °C) (Tympanic Core)   Resp 14   Ht 5' 4\" (1.626 m)   Wt 58.5 kg (129 lb)   SpO2 99%   BMI 22.14 kg/m²     Physical Exam  Vitals and nursing note reviewed.   Constitutional:       General: She is not in acute distress.     Appearance: Normal appearance. She is not ill-appearing, toxic-appearing or diaphoretic.   HENT:      Head: Normocephalic and atraumatic.      Right Ear: External ear normal.      Left Ear: External ear normal.      Nose: Nose normal.      Mouth/Throat:      Mouth: Mucous membranes are moist.      Pharynx: Oropharynx is clear. No oropharyngeal exudate or posterior oropharyngeal erythema.   Eyes:      General: Lids are normal. No scleral icterus.        Right eye: No " discharge.         Left eye: No discharge.      Extraocular Movements: Extraocular movements intact.      Conjunctiva/sclera: Conjunctivae normal.      Pupils: Pupils are equal, round, and reactive to light.   Cardiovascular:      Rate and Rhythm: Normal rate and regular rhythm. No extrasystoles are present.     Heart sounds: S1 normal and S2 normal. No murmur heard.  Pulmonary:      Effort: Pulmonary effort is normal. No respiratory distress.      Breath sounds: Normal breath sounds. No stridor or decreased air movement. No wheezing or rhonchi.   Abdominal:      General: Bowel sounds are normal.      Palpations: Abdomen is soft.      Tenderness: There is no abdominal tenderness.   Musculoskeletal:         General: Normal range of motion.      Cervical back: Normal range of motion and neck supple.   Skin:     General: Skin is warm and dry.      Findings: Petechiae (minimal- diffusely spread along legs/trunk/arms) present.   Neurological:      General: No focal deficit present.      Mental Status: She is alert and oriented to person, place, and time. Mental status is at baseline.      Cranial Nerves: No cranial nerve deficit.      Sensory: No sensory deficit.      Motor: No weakness.      Coordination: Coordination normal.      Gait: Gait normal.   Psychiatric:         Attention and Perception: Attention and perception normal.         Mood and Affect: Mood and affect normal.         Speech: Speech normal.         Behavior: Behavior is cooperative.         Cognition and Memory: Cognition normal.         Judgment: Judgment normal.

## 2024-12-30 ENCOUNTER — APPOINTMENT (OUTPATIENT)
Dept: LAB | Facility: CLINIC | Age: 54
End: 2024-12-30
Payer: COMMERCIAL

## 2024-12-30 DIAGNOSIS — R23.3 PETECHIAE: ICD-10-CM

## 2024-12-30 DIAGNOSIS — Z00.00 HEALTH MAINTENANCE EXAMINATION: ICD-10-CM

## 2024-12-30 LAB
ALBUMIN SERPL BCG-MCNC: 4.5 G/DL (ref 3.5–5)
ALP SERPL-CCNC: 61 U/L (ref 34–104)
ALT SERPL W P-5'-P-CCNC: 11 U/L (ref 7–52)
ANION GAP SERPL CALCULATED.3IONS-SCNC: 7 MMOL/L (ref 4–13)
APTT PPP: 26 SECONDS (ref 23–34)
AST SERPL W P-5'-P-CCNC: 20 U/L (ref 13–39)
BASOPHILS # BLD AUTO: 0.06 THOUSANDS/ΜL (ref 0–0.1)
BASOPHILS NFR BLD AUTO: 2 % (ref 0–1)
BILIRUB SERPL-MCNC: 0.61 MG/DL (ref 0.2–1)
BUN SERPL-MCNC: 11 MG/DL (ref 5–25)
CALCIUM SERPL-MCNC: 9.6 MG/DL (ref 8.4–10.2)
CHLORIDE SERPL-SCNC: 101 MMOL/L (ref 96–108)
CHOLEST SERPL-MCNC: 255 MG/DL (ref ?–200)
CO2 SERPL-SCNC: 30 MMOL/L (ref 21–32)
CREAT SERPL-MCNC: 0.76 MG/DL (ref 0.6–1.3)
EOSINOPHIL # BLD AUTO: 0.08 THOUSAND/ΜL (ref 0–0.61)
EOSINOPHIL NFR BLD AUTO: 2 % (ref 0–6)
ERYTHROCYTE [DISTWIDTH] IN BLOOD BY AUTOMATED COUNT: 13.8 % (ref 11.6–15.1)
GFR SERPL CREATININE-BSD FRML MDRD: 87 ML/MIN/1.73SQ M
GLUCOSE P FAST SERPL-MCNC: 86 MG/DL (ref 65–99)
HCT VFR BLD AUTO: 43.1 % (ref 34.8–46.1)
HDLC SERPL-MCNC: 66 MG/DL
HGB BLD-MCNC: 13.7 G/DL (ref 11.5–15.4)
IMM GRANULOCYTES # BLD AUTO: 0.01 THOUSAND/UL (ref 0–0.2)
IMM GRANULOCYTES NFR BLD AUTO: 0 % (ref 0–2)
INR PPP: 0.94 (ref 0.85–1.19)
LDLC SERPL CALC-MCNC: 156 MG/DL (ref 0–100)
LYMPHOCYTES # BLD AUTO: 1.21 THOUSANDS/ΜL (ref 0.6–4.47)
LYMPHOCYTES NFR BLD AUTO: 34 % (ref 14–44)
MCH RBC QN AUTO: 30.7 PG (ref 26.8–34.3)
MCHC RBC AUTO-ENTMCNC: 31.8 G/DL (ref 31.4–37.4)
MCV RBC AUTO: 97 FL (ref 82–98)
MONOCYTES # BLD AUTO: 0.38 THOUSAND/ΜL (ref 0.17–1.22)
MONOCYTES NFR BLD AUTO: 11 % (ref 4–12)
NEUTROPHILS # BLD AUTO: 1.8 THOUSANDS/ΜL (ref 1.85–7.62)
NEUTS SEG NFR BLD AUTO: 51 % (ref 43–75)
NONHDLC SERPL-MCNC: 189 MG/DL
NRBC BLD AUTO-RTO: 0 /100 WBCS
PLATELET # BLD AUTO: 298 THOUSANDS/UL (ref 149–390)
PMV BLD AUTO: 11 FL (ref 8.9–12.7)
POTASSIUM SERPL-SCNC: 4 MMOL/L (ref 3.5–5.3)
PROT SERPL-MCNC: 7.5 G/DL (ref 6.4–8.4)
PROTHROMBIN TIME: 12.8 SECONDS (ref 12.3–15)
RBC # BLD AUTO: 4.46 MILLION/UL (ref 3.81–5.12)
SODIUM SERPL-SCNC: 138 MMOL/L (ref 135–147)
TRIGL SERPL-MCNC: 163 MG/DL (ref ?–150)
TSH SERPL DL<=0.05 MIU/L-ACNC: 1.69 UIU/ML (ref 0.45–4.5)
WBC # BLD AUTO: 3.54 THOUSAND/UL (ref 4.31–10.16)

## 2024-12-30 PROCEDURE — 80053 COMPREHEN METABOLIC PANEL: CPT

## 2024-12-30 PROCEDURE — 84443 ASSAY THYROID STIM HORMONE: CPT

## 2024-12-30 PROCEDURE — 85730 THROMBOPLASTIN TIME PARTIAL: CPT

## 2024-12-30 PROCEDURE — 85025 COMPLETE CBC W/AUTO DIFF WBC: CPT

## 2024-12-30 PROCEDURE — 36415 COLL VENOUS BLD VENIPUNCTURE: CPT

## 2024-12-30 PROCEDURE — 85610 PROTHROMBIN TIME: CPT

## 2024-12-30 PROCEDURE — 80061 LIPID PANEL: CPT

## 2025-01-09 ENCOUNTER — RESULTS FOLLOW-UP (OUTPATIENT)
Dept: FAMILY MEDICINE CLINIC | Facility: CLINIC | Age: 55
End: 2025-01-09

## 2025-02-18 ENCOUNTER — APPOINTMENT (EMERGENCY)
Dept: RADIOLOGY | Facility: HOSPITAL | Age: 55
End: 2025-02-18
Payer: COMMERCIAL

## 2025-02-18 ENCOUNTER — HOSPITAL ENCOUNTER (EMERGENCY)
Facility: HOSPITAL | Age: 55
Discharge: HOME | End: 2025-02-18
Attending: INTERNAL MEDICINE
Payer: COMMERCIAL

## 2025-02-18 VITALS
TEMPERATURE: 97.7 F | SYSTOLIC BLOOD PRESSURE: 148 MMHG | HEART RATE: 75 BPM | RESPIRATION RATE: 18 BRPM | WEIGHT: 132.5 LBS | OXYGEN SATURATION: 100 % | DIASTOLIC BLOOD PRESSURE: 90 MMHG | BODY MASS INDEX: 22.74 KG/M2

## 2025-02-18 DIAGNOSIS — M54.50 LOW BACK PAIN: Primary | ICD-10-CM

## 2025-02-18 LAB
BILIRUB UR QL STRIP: NEGATIVE
CLARITY UR: CLEAR
COLOR UR: YELLOW
GLUCOSE UR STRIP-MCNC: NEGATIVE MG/DL
HGB UR QL STRIP.AUTO: NEGATIVE
KETONES UR STRIP-MCNC: NEGATIVE MG/DL
LEUKOCYTE ESTERASE UR QL STRIP: NEGATIVE
NITRITE UR QL STRIP: NEGATIVE
PH UR STRIP.AUTO: 6 [PH] (ref 4.5–8)
PROT UR STRIP-MCNC: NEGATIVE MG/DL
SP GR UR STRIP.AUTO: 1.01 (ref 1–1.03)
UROBILINOGEN UR QL STRIP.AUTO: 0.2 E.U./DL

## 2025-02-18 PROCEDURE — 96372 THER/PROPH/DIAG INJ SC/IM: CPT

## 2025-02-18 PROCEDURE — 72100 X-RAY EXAM L-S SPINE 2/3 VWS: CPT

## 2025-02-18 PROCEDURE — 81003 URINALYSIS AUTO W/O SCOPE: CPT

## 2025-02-18 PROCEDURE — 99283 EMERGENCY DEPT VISIT LOW MDM: CPT

## 2025-02-18 PROCEDURE — 99284 EMERGENCY DEPT VISIT MOD MDM: CPT

## 2025-02-18 RX ORDER — LIDOCAINE 50 MG/G
1 PATCH TOPICAL DAILY
Qty: 30 PATCH | Refills: 0 | Status: SHIPPED | OUTPATIENT
Start: 2025-02-18

## 2025-02-18 RX ORDER — LIDOCAINE 50 MG/G
2 PATCH TOPICAL ONCE
Status: DISCONTINUED | OUTPATIENT
Start: 2025-02-18 | End: 2025-02-18 | Stop reason: HOSPADM

## 2025-02-18 RX ORDER — METHOCARBAMOL 500 MG/1
500 TABLET, FILM COATED ORAL 2 TIMES DAILY
Qty: 20 TABLET | Refills: 0 | Status: SHIPPED | OUTPATIENT
Start: 2025-02-18

## 2025-02-18 RX ORDER — KETOROLAC TROMETHAMINE 30 MG/ML
15 INJECTION, SOLUTION INTRAMUSCULAR; INTRAVENOUS ONCE
Status: COMPLETED | OUTPATIENT
Start: 2025-02-18 | End: 2025-02-18

## 2025-02-18 RX ORDER — METHOCARBAMOL 500 MG/1
500 TABLET, FILM COATED ORAL ONCE
Status: COMPLETED | OUTPATIENT
Start: 2025-02-18 | End: 2025-02-18

## 2025-02-18 RX ADMIN — LIDOCAINE 2 PATCH: 50 PATCH TOPICAL at 11:27

## 2025-02-18 RX ADMIN — METHOCARBAMOL 500 MG: 500 TABLET ORAL at 11:25

## 2025-02-18 RX ADMIN — KETOROLAC TROMETHAMINE 15 MG: 30 INJECTION, SOLUTION INTRAMUSCULAR; INTRAVENOUS at 11:26

## 2025-02-18 NOTE — ED PROVIDER NOTES
Time reflects when diagnosis was documented in both MDM as applicable and the Disposition within this note       Time User Action Codes Description Comment    2/18/2025 12:24 PM Cornelia Cabrera Add [M54.50] Low back pain           ED Disposition       ED Disposition   Discharge    Condition   Stable    Date/Time   Tue Feb 18, 2025 12:22 PM    Comment   Soraya Ott discharge to home/self care.                   Assessment & Plan       Medical Decision Making  Patient is a 54-year-old female presenting with low back pain that started this morning.  Vitals within normal limits on arrival and she is in no acute distress.  She has her baseline mild right foot drop and diminished sensation on the right lateral leg however she is otherwise neurovascularly intact in lower extremities.  No bowel or bladder dysfunction or saddle anesthesia concerning for cauda equina.  Suspect muscle spasm versus spinal pathology versus UTI.  Will obtain x-ray, UA, and treat symptomatically.    X-ray without acute abnormality as interpreted by myself.  UA without evidence of infection.  Patient had improvement in symptoms with medications in the ED.  Will discharge with Robaxin, Lidoderm, Motrin, Tylenol for symptomatic management.  Placed ambulatory referral for comprehensive spine program for follow-up.    I have discussed findings and plan for discharge with the patient/caregiver. Follow up with the appropriate providers including primary care physician was discussed. Return precautions discussed with patient/caregiver as outlined in AVS. Patient/caregiver verbally expressed understanding. Patient stable at time of discharge and ambulated out of the emergency department.     Amount and/or Complexity of Data Reviewed  Labs:  Decision-making details documented in ED Course.  Radiology: ordered and independent interpretation performed.    Risk  Prescription drug management.        ED Course as of 02/18/25 1507   Tue Feb 18, 2025   1137  "Nitrite, UA: Negative  No signs of infection.       Medications   ketorolac (TORADOL) injection 15 mg (15 mg Intramuscular Given 2/18/25 1126)   methocarbamol (ROBAXIN) tablet 500 mg (500 mg Oral Given 2/18/25 1125)       ED Risk Strat Scores                            SBIRT 22yo+      Flowsheet Row Most Recent Value   Initial Alcohol Screen: US AUDIT-C     1. How often do you have a drink containing alcohol? 0 Filed at: 02/18/2025 1015   2. How many drinks containing alcohol do you have on a typical day you are drinking?  0 Filed at: 02/18/2025 1015   3b. FEMALE Any Age, or MALE 65+: How often do you have 4 or more drinks on one occassion? 0 Filed at: 02/18/2025 1015   Audit-C Score 0 Filed at: 02/18/2025 1015   ROBERTA: How many times in the past year have you...    Used an illegal drug or used a prescription medication for non-medical reasons? Never Filed at: 02/18/2025 1015                            History of Present Illness       Chief Complaint   Patient presents with    Back Pain     Pt was picking things up on the carpet on her hands and knees when both sides of her lower back \"turned to liquid\", states she threw her back out        Past Medical History:   Diagnosis Date    Abnormal Pap smear of cervix       Past Surgical History:   Procedure Laterality Date    APPENDECTOMY  1998    HAND SURGERY      dog bite    REPLACEMENT TOTAL HIP W/  RESURFACING IMPLANTS Right 2022      Family History   Problem Relation Age of Onset    Hypertension Mother     Alcohol abuse Father     Diabetes Father     Diabetes Sister     Early death Sister     No Known Problems Sister     No Known Problems Sister     Breast cancer Sister     Heart disease Maternal Grandmother     Heart attack Maternal Grandmother     Colon cancer Maternal Grandmother     Dementia Maternal Grandfather     Early death Paternal Grandmother     Alcohol abuse Paternal Grandfather     GI problems Son     No Known Problems Maternal Aunt     No Known Problems " Maternal Aunt     Ovarian cancer Neg Hx       Social History     Tobacco Use    Smoking status: Never    Smokeless tobacco: Never   Vaping Use    Vaping status: Never Used   Substance Use Topics    Alcohol use: Not Currently    Drug use: Never      E-Cigarette/Vaping    E-Cigarette Use Never User       E-Cigarette/Vaping Substances    Nicotine No     THC No     CBD No     Flavoring No     Other No     Unknown No       I have reviewed and agree with the history as documented.     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  She was kneeling a few hours ago and reached to pick something up off the floor when she felt spasming/pain along her lower back bilaterally.  Pain does not radiate into the legs.  No abdominal pain or urinary symptoms.  No bowel or bladder dysfunction, saddle anesthesia.  No medications prior to arrival.      Back Pain  Associated symptoms: no abdominal pain, no chest pain, no dysuria and no fever        Review of Systems   Constitutional:  Negative for chills and fever.   HENT:  Negative for ear pain and sore throat.    Eyes:  Negative for pain and visual disturbance.   Respiratory:  Negative for cough and shortness of breath.    Cardiovascular:  Negative for chest pain and palpitations.   Gastrointestinal:  Negative for abdominal pain, diarrhea, nausea and vomiting.   Genitourinary:  Negative for dysuria and hematuria.   Musculoskeletal:  Positive for back pain. Negative for arthralgias.   Skin:  Negative for color change and rash.   Neurological:  Negative for seizures and syncope.   All other systems reviewed and are negative.          Objective       ED Triage Vitals   Temperature Pulse Blood Pressure Respirations SpO2 Patient Position - Orthostatic VS   02/18/25 1015 02/18/25 1015 02/18/25 1015 02/18/25 1015 02/18/25 1015 02/18/25 1015   97.7 °F (36.5 °C) 75 148/90 18 100 % Sitting       Temp Source Heart Rate Source BP Location FiO2 (%) Pain Score    02/18/25 1015 02/18/25 1015 02/18/25 1015 -- 02/18/25 1126    Oral Monitor Right arm  4      Vitals      Date and Time Temp Pulse SpO2 Resp BP Pain Score FACES Pain Rating User   02/18/25 1126 -- -- -- -- -- 4 -- EC   02/18/25 1015 97.7 °F (36.5 °C) 75 100 % 18 148/90 -- -- RENÉE            Physical Exam  Vitals and nursing note reviewed.   Constitutional:       General: She is not in acute distress.     Appearance: Normal appearance. She is not toxic-appearing.   HENT:      Head: Normocephalic and atraumatic.      Right Ear: External ear normal.      Left Ear: External ear normal.      Nose: Nose normal.      Mouth/Throat:      Mouth: Mucous membranes are moist.   Eyes:      General: No scleral icterus.        Right eye: No discharge.         Left eye: No discharge.      Extraocular Movements: Extraocular movements intact.      Conjunctiva/sclera: Conjunctivae normal.   Cardiovascular:      Rate and Rhythm: Normal rate and regular rhythm.      Pulses: Normal pulses.      Heart sounds: Normal heart sounds.   Pulmonary:      Effort: Pulmonary effort is normal. No respiratory distress.      Breath sounds: Normal breath sounds.   Abdominal:      Palpations: Abdomen is soft.      Tenderness: There is no abdominal tenderness. There is no right CVA tenderness or left CVA tenderness.   Musculoskeletal:         General: No tenderness, deformity or signs of injury.      Cervical back: Normal, normal range of motion and neck supple.      Thoracic back: Normal.      Comments: Tenderness to bilateral lumbar paraspinal muscles. No midline spine tenderness, step offs, deformities. Strength intact with exception of mild foot drop in RLE. Diminished sensation right lateral leg which is baseline, sensation equal and intact otherwise in bilateral lower extremities. DP and PT pulses 2+ bilaterally.     Skin:     General: Skin is dry.      Coloration: Skin is not  jaundiced.      Findings: No erythema or rash.   Neurological:      General: No focal deficit present.      Mental Status: She is alert and oriented to person, place, and time. Mental status is at baseline.      Motor: No weakness.      Gait: Gait normal.   Psychiatric:         Mood and Affect: Mood normal.         Behavior: Behavior normal.         Thought Content: Thought content normal.         Results Reviewed       Procedure Component Value Units Date/Time    Urine Macroscopic, POC [[id number]] Collected: 02/18/25 1123    Lab Status: Final result Specimen: Urine Updated: 02/18/25 1137     Color, UA Yellow     Clarity, UA Clear     pH, UA 6.0     Leukocytes, UA Negative     Nitrite, UA Negative     Protein, UA Negative mg/dl      Glucose, UA Negative mg/dl      Ketones, UA Negative mg/dl      Urobilinogen, UA 0.2 E.U./dl      Bilirubin, UA Negative     Occult Blood, UA Negative     Specific Gravity, UA 1.010    Narrative:      CLINITEK RESULT            XR spine lumbar 2 or 3 views injury   ED Interpretation by Cornelia Cabrera PA-C (02/18 4307)   No acute fractures or dislocations as interpreted by myself.            Procedures    ED Medication and Procedure Management   Prior to Admission Medications   Prescriptions Last Dose Informant Patient Reported? Taking?   Diclofenac Sodium (VOLTAREN) 1 %   No No   Sig: Apply 2 g topically 4 (four) times a day   methocarbamol (ROBAXIN) 500 mg tablet   No No   Sig: Take 1 tablet (500 mg total) by mouth 3 (three) times a day as needed for muscle spasms   triamcinolone (KENALOG) 0.1 % cream   No No   Sig: Apply topically 2 (two) times a day   Patient not taking: Reported on 12/23/2024      Facility-Administered Medications: None     Discharge Medication List as of 2/18/2025 12:24 PM        START taking these medications    Details   lidocaine (Lidoderm) 5 % Apply 1 patch topically over 12 hours daily Remove & Discard patch within 12 hours or as directed by MD,  Starting Tue 2/18/2025, Normal      !! methocarbamol (ROBAXIN) 500 mg tablet Take 1 tablet (500 mg total) by mouth 2 (two) times a day, Starting Tue 2/18/2025, Normal       !! - Potential duplicate medications found. Please discuss with provider.        CONTINUE these medications which have NOT CHANGED    Details   Diclofenac Sodium (VOLTAREN) 1 % Apply 2 g topically 4 (four) times a day, Starting Mon 4/1/2024, Normal      !! methocarbamol (ROBAXIN) 500 mg tablet Take 1 tablet (500 mg total) by mouth 3 (three) times a day as needed for muscle spasms, Starting Tue 8/22/2023, Normal      triamcinolone (KENALOG) 0.1 % cream Apply topically 2 (two) times a day, Starting Tue 11/12/2024, Normal       !! - Potential duplicate medications found. Please discuss with provider.          ED SEPSIS DOCUMENTATION   Time reflects when diagnosis was documented in both MDM as applicable and the Disposition within this note       Time User Action Codes Description Comment    2/18/2025 12:24 PM Cornelia Cabrera Add [M54.50] Low back pain                  Cornelia Cabrera PA-C  02/18/25 1500

## 2025-02-18 NOTE — Clinical Note
Soraya Ott was seen and treated in our emergency department on 2/18/2025.                Diagnosis:     Soraya  .    She may return on this date: 02/19/2025         If you have any questions or concerns, please don't hesitate to call.      Cornelia Cabrera PA-C    ______________________________           _______________          _______________  Hospital Representative                              Date                                Time

## 2025-02-19 ENCOUNTER — TELEPHONE (OUTPATIENT)
Dept: PHYSICAL THERAPY | Facility: OTHER | Age: 55
End: 2025-02-19

## 2025-02-19 ENCOUNTER — VBI (OUTPATIENT)
Dept: FAMILY MEDICINE CLINIC | Facility: CLINIC | Age: 55
End: 2025-02-19

## 2025-02-19 NOTE — LETTER
[facility] PRIMARY [facility]  [address]  [place] [id number] [phone]    Date: 02/20/25    Soraya Ott  [address] [address]    Dear Soraya:                                                                                                                                Thank you for choosing [facility] emergency department for care.  Your primary care provider wants to make sure that your ongoing medical care is being addressed. If you require follow up care as a result of your emergency department visit, there are a few things the practice would like you to know.                As part of the network's continuing commitment to caring for our patients, we have added more same day appointments and have extended office hours to meet your medical needs. After hours, on-call physicians are available via your primary care provider's main office line.               We encourage you to contact our office prior to seeking treatment to discuss your symptoms with the medical staff.  Together, we can determine the correct course of action.  A majority of non-emergent conditions such as: common cold, flu-like symptoms, fevers, strains/sprains, dislocations, minor burns, cuts and animal bites can be treated at [facility] facilities. Diagnostic testing is available at some sites.               Of course, if you are experiencing a life threatening medical emergency call 911 or proceed directly to the nearest emergency room.    Your nearest [facility] facility is conveniently located at:    [facility]  [address] [place] [address] [address]  [place] [address]  [phone]  SKIP THE WAIT  Conveniently offered at most [facility] locations  [place] your spot online at [email or web address] or on the [facility]    Sincerely,    [facility] PRIMARY [facility]ARE  Dept: [phone]

## 2025-02-19 NOTE — TELEPHONE ENCOUNTER
02/19/25 9:54 AM    Patient contacted post ED visit, first outreach attempt made. Message was left for patient to return a call to the VBI Department at [facility]: Phone [phone].    Thank you.  Julianna Agrawal MA  [facility]

## 2025-02-19 NOTE — TELEPHONE ENCOUNTER
02/19/25 2:00 PM    Patient contacted post ED visit, second outreach attempt made. Message was left for patient to return a call to the VBI Department at [facility]: Phone [phone].    Thank you.  Julianna Agrawal MA  [facility]

## 2025-02-20 NOTE — TELEPHONE ENCOUNTER
02/20/25 9:15 AM    Patient contacted post ED visit, third outreach attempt made. Message was left for patient to return a call to either the VBI Department at [facility]: Phone [phone] or the PCP office.     Thank you.  Julianna Agrawal MA  [facility]

## 2025-02-20 NOTE — TELEPHONE ENCOUNTER
02/20/25 9:15 AM    Patient contacted post ED visit, phone outreaches were unsuccessful and a MyChart letter has been sent to the patient as follow-up.    Thank you.  Julianna Agrawal MA  [facility]